# Patient Record
Sex: MALE | Race: WHITE | NOT HISPANIC OR LATINO
[De-identification: names, ages, dates, MRNs, and addresses within clinical notes are randomized per-mention and may not be internally consistent; named-entity substitution may affect disease eponyms.]

---

## 2018-10-02 ENCOUNTER — APPOINTMENT (OUTPATIENT)
Dept: NEUROLOGY | Facility: CLINIC | Age: 53
End: 2018-10-02
Payer: COMMERCIAL

## 2018-10-02 VITALS
SYSTOLIC BLOOD PRESSURE: 147 MMHG | BODY MASS INDEX: 26.79 KG/M2 | WEIGHT: 220 LBS | HEART RATE: 55 BPM | HEIGHT: 76 IN | DIASTOLIC BLOOD PRESSURE: 78 MMHG

## 2018-10-02 DIAGNOSIS — R42 DIZZINESS AND GIDDINESS: ICD-10-CM

## 2018-10-02 DIAGNOSIS — D72.9 DISORDER OF WHITE BLOOD CELLS, UNSPECIFIED: ICD-10-CM

## 2018-10-02 DIAGNOSIS — Q87.89 OTHER SPECIFIED CONGENITAL MALFORMATION SYNDROMES, NOT ELSEWHERE CLASSIFIED: ICD-10-CM

## 2018-10-02 DIAGNOSIS — F79 OTHER SPECIFIED CONGENITAL MALFORMATION SYNDROMES, NOT ELSEWHERE CLASSIFIED: ICD-10-CM

## 2018-10-02 DIAGNOSIS — Z86.79 PERSONAL HISTORY OF OTHER DISEASES OF THE CIRCULATORY SYSTEM: ICD-10-CM

## 2018-10-02 DIAGNOSIS — Q04.3 OTHER SPECIFIED CONGENITAL MALFORMATION SYNDROMES, NOT ELSEWHERE CLASSIFIED: ICD-10-CM

## 2018-10-02 PROCEDURE — 93892 TCD EMBOLI DETECT W/O INJ: CPT

## 2018-10-02 PROCEDURE — 93886 INTRACRANIAL COMPLETE STUDY: CPT

## 2018-10-02 PROCEDURE — 93880 EXTRACRANIAL BILAT STUDY: CPT

## 2018-10-02 PROCEDURE — 99205 OFFICE O/P NEW HI 60 MIN: CPT

## 2018-10-02 RX ORDER — CYCLOBENZAPRINE HYDROCHLORIDE 10 MG/1
10 TABLET, FILM COATED ORAL WEEKLY
Refills: 0 | Status: ACTIVE | COMMUNITY

## 2018-10-03 ENCOUNTER — TRANSCRIPTION ENCOUNTER (OUTPATIENT)
Age: 53
End: 2018-10-03

## 2021-02-14 ENCOUNTER — NON-APPOINTMENT (OUTPATIENT)
Age: 56
End: 2021-02-14

## 2021-02-19 ENCOUNTER — APPOINTMENT (OUTPATIENT)
Dept: DISASTER EMERGENCY | Facility: CLINIC | Age: 56
End: 2021-02-19

## 2021-02-19 DIAGNOSIS — Z01.818 ENCOUNTER FOR OTHER PREPROCEDURAL EXAMINATION: ICD-10-CM

## 2021-02-20 LAB — SARS-COV-2 N GENE NPH QL NAA+PROBE: NOT DETECTED

## 2021-07-15 ENCOUNTER — OUTPATIENT (OUTPATIENT)
Dept: OUTPATIENT SERVICES | Facility: HOSPITAL | Age: 56
LOS: 1 days | Discharge: HOME | End: 2021-07-15
Payer: COMMERCIAL

## 2021-07-15 VITALS
SYSTOLIC BLOOD PRESSURE: 148 MMHG | HEART RATE: 68 BPM | RESPIRATION RATE: 16 BRPM | DIASTOLIC BLOOD PRESSURE: 86 MMHG | OXYGEN SATURATION: 98 % | TEMPERATURE: 98 F | HEIGHT: 76 IN | WEIGHT: 220.46 LBS

## 2021-07-15 DIAGNOSIS — Z98.890 OTHER SPECIFIED POSTPROCEDURAL STATES: Chronic | ICD-10-CM

## 2021-07-15 DIAGNOSIS — M54.12 RADICULOPATHY, CERVICAL REGION: ICD-10-CM

## 2021-07-15 DIAGNOSIS — Z90.89 ACQUIRED ABSENCE OF OTHER ORGANS: Chronic | ICD-10-CM

## 2021-07-15 DIAGNOSIS — Z01.818 ENCOUNTER FOR OTHER PREPROCEDURAL EXAMINATION: ICD-10-CM

## 2021-07-15 LAB
ANION GAP SERPL CALC-SCNC: 7 MMOL/L — SIGNIFICANT CHANGE UP (ref 5–17)
BUN SERPL-MCNC: 11 MG/DL — SIGNIFICANT CHANGE UP (ref 7–23)
CALCIUM SERPL-MCNC: 8.9 MG/DL — SIGNIFICANT CHANGE UP (ref 8.5–10.1)
CHLORIDE SERPL-SCNC: 104 MMOL/L — SIGNIFICANT CHANGE UP (ref 96–108)
CO2 SERPL-SCNC: 27 MMOL/L — SIGNIFICANT CHANGE UP (ref 22–31)
CREAT SERPL-MCNC: 1.26 MG/DL — SIGNIFICANT CHANGE UP (ref 0.5–1.3)
GLUCOSE SERPL-MCNC: 89 MG/DL — SIGNIFICANT CHANGE UP (ref 70–99)
HCT VFR BLD CALC: 46 % — SIGNIFICANT CHANGE UP (ref 39–50)
HGB BLD-MCNC: 15.4 G/DL — SIGNIFICANT CHANGE UP (ref 13–17)
MCHC RBC-ENTMCNC: 30.7 PG — SIGNIFICANT CHANGE UP (ref 27–34)
MCHC RBC-ENTMCNC: 33.5 GM/DL — SIGNIFICANT CHANGE UP (ref 32–36)
MCV RBC AUTO: 91.8 FL — SIGNIFICANT CHANGE UP (ref 80–100)
NRBC # BLD: 0 /100 WBCS — SIGNIFICANT CHANGE UP (ref 0–0)
PLATELET # BLD AUTO: 233 K/UL — SIGNIFICANT CHANGE UP (ref 150–400)
POTASSIUM SERPL-MCNC: 3.8 MMOL/L — SIGNIFICANT CHANGE UP (ref 3.5–5.3)
POTASSIUM SERPL-SCNC: 3.8 MMOL/L — SIGNIFICANT CHANGE UP (ref 3.5–5.3)
RBC # BLD: 5.01 M/UL — SIGNIFICANT CHANGE UP (ref 4.2–5.8)
RBC # FLD: 12.8 % — SIGNIFICANT CHANGE UP (ref 10.3–14.5)
SODIUM SERPL-SCNC: 138 MMOL/L — SIGNIFICANT CHANGE UP (ref 135–145)
WBC # BLD: 10.44 K/UL — SIGNIFICANT CHANGE UP (ref 3.8–10.5)
WBC # FLD AUTO: 10.44 K/UL — SIGNIFICANT CHANGE UP (ref 3.8–10.5)

## 2021-07-15 PROCEDURE — 93010 ELECTROCARDIOGRAM REPORT: CPT

## 2021-07-15 RX ORDER — SODIUM CHLORIDE 9 MG/ML
3 INJECTION INTRAMUSCULAR; INTRAVENOUS; SUBCUTANEOUS EVERY 8 HOURS
Refills: 0 | Status: DISCONTINUED | OUTPATIENT
Start: 2021-07-20 | End: 2021-08-03

## 2021-07-15 NOTE — H&P PST ADULT - NSICDXPROBLEM_GEN_ALL_CORE_FT
PROBLEM DIAGNOSES  Problem: Radiculopathy, cervical region  Assessment and Plan: decompression laminectomy   Pre-op instructions given by RN, patient verbalized understanding  Chlorhexidine wash instructions given   medical clearance pending

## 2021-07-15 NOTE — H&P PST ADULT - ATTENDING COMMENTS
indicated for lumbar decompression due to lumbar stenosis with radiculopathy - r/b/e of the procedure discussed and questions answered - well informed and would like to proceed

## 2021-07-15 NOTE — H&P PST ADULT - HISTORY OF PRESENT ILLNESS
55M no pmhx c/o low back pain radiating to RLE associated with numbness and tingling 2/2 radiculopathy here for PST for [id number] Decompression laminectomy'  this patient denies any fever, cough, sob, flu like symptoms or travel outside of the [place] in the past 30 days  COVID vaccination completed copy of card on chart

## 2021-07-15 NOTE — H&P PST ADULT - ASSESSMENT
55M no pmhx c/o low back pain radiating to RLE associated with numbness and tingling 2/2 radiculopathy here for PST for [id number] Decompression laminectomy'  CAPRINI SCORE    AGE RELATED RISK FACTORS                                                       MOBILITY RELATED FACTORS  [x ] Age 41-60 years                                            (1 Point)                  [ ] Bed rest                                                        (1 Point)  [ ] Age: 61-74 years                                           (2 Points)                [ ] Plaster cast                                                   (2 Points)  [ ] Age= 75 years                                              (3 Points)                 [ ] Bed bound for more than 72 hours                   (2 Points)    DISEASE RELATED RISK FACTORS                                               GENDER SPECIFIC FACTORS  [x ] Edema in the lower extremities                       (1 Point)                  [ ] Pregnancy                                                     (1 Point)  [ ] Varicose veins                                               (1 Point)                  [ ] Post-partum < 6 weeks                                   (1 Point)             [x ] BMI > 25 Kg/m2                                            (1 Point)                  [ ] Hormonal therapy  or oral contraception            (1 Point)                 [ ] Sepsis (in the previous month)                        (1 Point)                  [ ] History of pregnancy complications  [ ] Pneumonia or serious lung disease                                               [ ] Unexplained or recurrent                       (1 Point)           (in the previous month)                               (1 Point)  [ ] Abnormal pulmonary function test                     (1 Point)                 SURGERY RELATED RISK FACTORS  [ ] Acute myocardial infarction                              (1 Point)                 [ ]  Section                                            (1 Point)  [ ] Congestive heart failure (in the previous month)  (1 Point)                 [ ] Minor surgery                                                 (1 Point)   [ ] Inflammatory bowel disease                             (1 Point)                 [ ] Arthroscopic surgery                                        (2 Points)  [ ] Central venous access                                    (2 Points)                [x ] General surgery lasting more than 45 minutes   (2 Points)       [ ] Stroke (in the previous month)                          (5 Points)               [ ] Elective arthroplasty                                        (5 Points)                                                                                                                                               HEMATOLOGY RELATED FACTORS                                                 TRAUMA RELATED RISK FACTORS  [ ] Prior episodes of VTE                                     (3 Points)                 [ ] Fracture of the hip, pelvis, or leg                       (5 Points)  [ ] Positive family history for VTE                         (3 Points)                 [ ] Acute spinal cord injury (in the previous month)  (5 Points)  [ ] Prothrombin 16066 A                                      (3 Points)                 [ ] Paralysis  (less than 1 month)                          (5 Points)  [ ] Factor V Leiden                                             (3 Points)                 [ ] Multiple Trauma within 1 month                         (5 Points)  [ ] Lupus anticoagulants                                     (3 Points)                                                           [ ] Anticardiolipin antibodies                                (3 Points)                                                       [ ] High homocysteine in the blood                      (3 Points)                                             [ ] Other congenital or acquired thrombophilia       (3 Points)                                                [ ] Heparin induced thrombocytopenia                  (3 Points)                                          Total Score [      5    ]

## 2021-07-19 ENCOUNTER — TRANSCRIPTION ENCOUNTER (OUTPATIENT)
Age: 56
End: 2021-07-19

## 2021-07-20 ENCOUNTER — OUTPATIENT (OUTPATIENT)
Dept: OUTPATIENT SERVICES | Facility: HOSPITAL | Age: 56
LOS: 1 days | Discharge: HOME | End: 2021-07-20

## 2021-07-20 VITALS
SYSTOLIC BLOOD PRESSURE: 155 MMHG | HEART RATE: 63 BPM | DIASTOLIC BLOOD PRESSURE: 80 MMHG | RESPIRATION RATE: 15 BRPM | OXYGEN SATURATION: 97 %

## 2021-07-20 VITALS
HEART RATE: 72 BPM | HEIGHT: 76 IN | TEMPERATURE: 99 F | WEIGHT: 229.94 LBS | DIASTOLIC BLOOD PRESSURE: 77 MMHG | RESPIRATION RATE: 16 BRPM | SYSTOLIC BLOOD PRESSURE: 154 MMHG | OXYGEN SATURATION: 98 %

## 2021-07-20 DIAGNOSIS — Z98.890 OTHER SPECIFIED POSTPROCEDURAL STATES: Chronic | ICD-10-CM

## 2021-07-20 DIAGNOSIS — Z90.89 ACQUIRED ABSENCE OF OTHER ORGANS: Chronic | ICD-10-CM

## 2021-07-20 RX ORDER — HYDROMORPHONE HYDROCHLORIDE 2 MG/ML
0.5 INJECTION INTRAMUSCULAR; INTRAVENOUS; SUBCUTANEOUS
Refills: 0 | Status: DISCONTINUED | OUTPATIENT
Start: 2021-07-20 | End: 2021-07-20

## 2021-07-20 RX ORDER — SODIUM CHLORIDE 9 MG/ML
1000 INJECTION, SOLUTION INTRAVENOUS
Refills: 0 | Status: DISCONTINUED | OUTPATIENT
Start: 2021-07-20 | End: 2021-07-20

## 2021-07-20 RX ORDER — GABAPENTIN 400 MG/1
1 CAPSULE ORAL
Qty: 0 | Refills: 0 | DISCHARGE

## 2021-07-20 RX ORDER — ACETAMINOPHEN 500 MG
1000 TABLET ORAL ONCE
Refills: 0 | Status: COMPLETED | OUTPATIENT
Start: 2021-07-20 | End: 2021-07-20

## 2021-07-20 RX ADMIN — HYDROMORPHONE HYDROCHLORIDE 0.5 MILLIGRAM(S): 2 INJECTION INTRAMUSCULAR; INTRAVENOUS; SUBCUTANEOUS at 15:00

## 2021-07-20 RX ADMIN — Medication 1000 MILLIGRAM(S): at 15:20

## 2021-07-20 RX ADMIN — HYDROMORPHONE HYDROCHLORIDE 0.5 MILLIGRAM(S): 2 INJECTION INTRAMUSCULAR; INTRAVENOUS; SUBCUTANEOUS at 15:20

## 2021-07-20 RX ADMIN — SODIUM CHLORIDE 75 MILLILITER(S): 9 INJECTION, SOLUTION INTRAVENOUS at 15:00

## 2021-07-20 RX ADMIN — Medication 400 MILLIGRAM(S): at 15:00

## 2021-07-20 NOTE — ASU DISCHARGE PLAN (ADULT/PEDIATRIC) - ASU DC SPECIAL INSTRUCTIONSFT
ok to change dressing with sterile gauze post op day#3. Keep incision dry. If gauze gets wet it must be changed. DO not apply any topical lotions or creams to wound. Avoid bending, lifting and twisting.

## 2021-07-20 NOTE — ASU DISCHARGE PLAN (ADULT/PEDIATRIC) - CALL YOUR DOCTOR IF YOU HAVE ANY OF THE FOLLOWING:
Swelling that gets worse/Pain not relieved by Medications/Fever greater than (need to indicate Fahrenheit or Celsius)/Wound/Surgical Site with redness, or foul smelling discharge or pus/Nausea and vomiting that does not stop/Unable to urinate

## 2021-07-22 DIAGNOSIS — M48.07 SPINAL STENOSIS, LUMBOSACRAL REGION: ICD-10-CM

## 2021-07-22 DIAGNOSIS — Z88.0 ALLERGY STATUS TO PENICILLIN: ICD-10-CM

## 2021-07-22 DIAGNOSIS — M54.17 RADICULOPATHY, LUMBOSACRAL REGION: ICD-10-CM

## 2021-07-22 DIAGNOSIS — Z72.0 TOBACCO USE: ICD-10-CM

## 2022-09-02 ENCOUNTER — TRANSCRIPTION ENCOUNTER (OUTPATIENT)
Age: 57
End: 2022-09-02

## 2022-09-02 ENCOUNTER — APPOINTMENT (OUTPATIENT)
Dept: ORTHOPEDIC SURGERY | Facility: CLINIC | Age: 57
End: 2022-09-02

## 2022-09-02 VITALS — WEIGHT: 220 LBS | HEIGHT: 76 IN | BODY MASS INDEX: 26.79 KG/M2

## 2022-09-02 PROCEDURE — 99214 OFFICE O/P EST MOD 30 MIN: CPT

## 2022-09-02 RX ORDER — COLLAGENASE CLOSTRIDIUM HISTOLYTICUM 0.9 MG
0.9 KIT INJECTION
Qty: 2 | Refills: 0 | Status: ACTIVE | COMMUNITY
Start: 2022-09-02 | End: 1900-01-01

## 2022-09-02 NOTE — HISTORY OF PRESENT ILLNESS
[7] : 7 [6] : 6 [Dull/Aching] : dull/aching [de-identified] : L hand getting worse\par Difficulty with ADLS\par \par R hand is stable [] : no [FreeTextEntry1] : hands [FreeTextEntry3] : 2021 [FreeTextEntry5] : left hand can not straightnen Mf,SF-in plam feels nodules [de-identified] : 2021 [de-identified] : Dr. Chris

## 2022-09-02 NOTE — PHYSICAL EXAM
[de-identified] : R hand \par Dupuytrens disease, minimal contracture\par \par L hand \par MF MCP 45 deg contracture with palp cord\par SF PIP 30 deg contracture with palp cord\par +table top test\par

## 2023-01-23 ENCOUNTER — APPOINTMENT (OUTPATIENT)
Dept: ORTHOPEDIC SURGERY | Facility: CLINIC | Age: 58
End: 2023-01-23
Payer: COMMERCIAL

## 2023-01-23 VITALS — HEIGHT: 76 IN | BODY MASS INDEX: 26.79 KG/M2 | WEIGHT: 220 LBS

## 2023-01-23 PROCEDURE — 99213 OFFICE O/P EST LOW 20 MIN: CPT

## 2023-01-23 NOTE — PHYSICAL EXAM
[de-identified] : R hand \par Dupuytrens disease, minimal contracture\par \par L hand \par MF MCP 45 deg contracture with palp cord\par SF PIP 30 deg contracture with palp cord\par +table top test\par

## 2023-01-23 NOTE — HISTORY OF PRESENT ILLNESS
[5] : 5 [Dull/Aching] : dull/aching [de-identified] : Insurance not auth Xiaflex\par  [FreeTextEntry1] : L hand [de-identified] : none

## 2023-02-01 ENCOUNTER — APPOINTMENT (OUTPATIENT)
Dept: ORTHOPEDIC SURGERY | Facility: CLINIC | Age: 58
End: 2023-02-01
Payer: COMMERCIAL

## 2023-02-01 VITALS — BODY MASS INDEX: 26.79 KG/M2 | WEIGHT: 220 LBS | HEIGHT: 76 IN

## 2023-02-01 PROCEDURE — 99214 OFFICE O/P EST MOD 30 MIN: CPT | Mod: 57

## 2023-02-03 NOTE — HISTORY OF PRESENT ILLNESS
[de-identified] : 57M, RHD, PMHx of orthostatic hypotension presents with left hand Dupuytren's contracture. Reports has had symptoms since 23021. Reports difficulty straightening middle finger and small finger. Feels nodules in palm. Starting to affect ADLs. DId see Dr. Chris, was trying to obtain Xiaflex injections but were denied by insurance.

## 2023-02-03 NOTE — ASSESSMENT
[FreeTextEntry1] : Left middle and small finger Dupuytren's contracture - discussed pathogenesis and anatomy with patient. Discussed this is a benign, progressive disease that is based in his genetics. Discuss treatment ladder including nonoperative management, aponeurotomy and fasciectomy. Discussed prophylactic operative treatment is not indicated for patients without contracture but that given his level of contracture and dysfunction in ADLs, intervention is indicated. Discussed risks, benefits, and alternatives with risks of infection, skin tear, recurrence, neurovascular injury, and tendon injury. Discussed that given his notable cord causing MP contracture, pursuing the less invasive aponeurotomy is reasonable. We discussed higher level of recurrence with aponeurotomy, higher level of risk of neurovascular injury with fasciectomy and that PIP contracture will unlikely resolve with aponeurotomy but that MP contracture should improve.\par \par Patient understood this discussion and elected to proceed with left middle and small finger aponeurotomy under local anesthesia. PJASC\par \par F/u 2 days after surgery ([facility] based - need to find OT, [place])

## 2023-02-03 NOTE — IMAGING
[de-identified] : Left hand with no erythema or swelling. Able to make fist and extend all digits except for small finger which possesses a pretendinous cord and thickened palmar tissue to middle and small finger resulting in 25-30 degree MCP contracture to MF and 25 at PIP of small finger. Sensation intact throughout with <2sec cap refill.

## 2023-02-15 ENCOUNTER — APPOINTMENT (OUTPATIENT)
Dept: ORTHOPEDIC SURGERY | Facility: AMBULATORY SURGERY CENTER | Age: 58
End: 2023-02-15
Payer: COMMERCIAL

## 2023-02-15 PROCEDURE — 26040 RELEASE PALM CONTRACTURE: CPT | Mod: LT,59

## 2023-02-21 ENCOUNTER — APPOINTMENT (OUTPATIENT)
Dept: ORTHOPEDIC SURGERY | Facility: CLINIC | Age: 58
End: 2023-02-21
Payer: COMMERCIAL

## 2023-02-21 VITALS — WEIGHT: 220 LBS | BODY MASS INDEX: 26.79 KG/M2 | HEIGHT: 76 IN

## 2023-02-21 PROCEDURE — 99024 POSTOP FOLLOW-UP VISIT: CPT

## 2023-02-26 NOTE — IMAGING
[de-identified] : Left hand with no erythema or swelling. Able to make fist and extend all digits. Well healed aponeurotomy sites with no erythema nor drainage. 0 degree MCP contracture at MF and SF with persistent PIP contracture at small finger PIP j. Sensation intact throughout with <2sec cap refill.

## 2023-02-26 NOTE — ASSESSMENT
[FreeTextEntry1] :  s/p left hand Dupuytrens aponeurotomy to middle and small fingers [2/15/23] - progressing well postop. Script for OT ROM and for nighttime extension brace provided. WBAT. Neosporin to site if dry.\par \par F/u 4-6 weeks.

## 2023-02-26 NOTE — HISTORY OF PRESENT ILLNESS
[de-identified] : 57M, RHD, PMHx of orthostatic hypotension presents with left hand Dupuytren's contracture. Reports has had symptoms since 23021. Reports difficulty straightening middle finger and small finger. Feels nodules in palm. Starting to affect ADLs. DId see Dr. Chris, was trying to obtain Xiaflex injections but were denied by insurance. \par \par 2/21/23: s/p left hand Dupuytrens aponeurotomy to middle and small fingers [2/15/23]. Patient reports some soreness, denies numbness/tingling. Soreenss happens with movement.

## 2023-03-28 ENCOUNTER — APPOINTMENT (OUTPATIENT)
Dept: ORTHOPEDIC SURGERY | Facility: CLINIC | Age: 58
End: 2023-03-28
Payer: COMMERCIAL

## 2023-03-28 VITALS — BODY MASS INDEX: 26.79 KG/M2 | HEIGHT: 76 IN | WEIGHT: 220 LBS

## 2023-03-28 DIAGNOSIS — M72.0 PALMAR FASCIAL FIBROMATOSIS [DUPUYTREN]: ICD-10-CM

## 2023-03-28 PROCEDURE — 99024 POSTOP FOLLOW-UP VISIT: CPT

## 2023-03-28 NOTE — IMAGING
[de-identified] : Left hand with no erythema or swelling. Able to make fist and extend all digits. Well healed aponeurotomy sites with no erythema nor drainage. 0 degree MCP contracture at MF and SF with persistent PIP contracture at small finger PIP j. Sensation intact throughout with <2sec cap refill.

## 2023-03-28 NOTE — ASSESSMENT
[FreeTextEntry1] :  s/p left hand Dupuytren's aponeurotomy to middle and small fingers [2/15/23] - progressing well postop. Script for OT ROM and for nighttime extension brace provided. WBAT. \par \par F/u 3mo/prn

## 2023-03-28 NOTE — HISTORY OF PRESENT ILLNESS
[de-identified] : 57M, RHD, PMHx of orthostatic hypotension presents with left hand Dupuytren's contracture. Reports has had symptoms since 23021. Reports difficulty straightening middle finger and small finger. Feels nodules in palm. Starting to affect ADLs. DId see Dr. Chris, was trying to obtain Xiaflex injections but were denied by insurance. \par \par 2/21/23: s/p left hand Dupuytrens aponeurotomy to middle and small fingers [2/15/23]. Patient reports some soreness, denies numbness/tingling. Soreenss happens with movement. \par \par 3/28/23: s/p left hand dupuytrens aponeurotomy to middle and small fingers [2/15/23]. He reports soreness while stretching the fingers, does have numbness/tingling at night while wearing the brace. No other issues.

## 2023-08-21 ENCOUNTER — APPOINTMENT (OUTPATIENT)
Dept: ORTHOPEDIC SURGERY | Facility: CLINIC | Age: 58
End: 2023-08-21
Payer: COMMERCIAL

## 2023-08-21 ENCOUNTER — APPOINTMENT (OUTPATIENT)
Dept: ORTHOPEDIC SURGERY | Facility: CLINIC | Age: 58
End: 2023-08-21

## 2023-08-21 VITALS — WEIGHT: 220 LBS | BODY MASS INDEX: 26.79 KG/M2 | HEIGHT: 76 IN

## 2023-08-21 PROCEDURE — 20551 NJX 1 TENDON ORIGIN/INSJ: CPT | Mod: LT

## 2023-08-21 PROCEDURE — J3490M: CUSTOM

## 2023-08-21 PROCEDURE — 99214 OFFICE O/P EST MOD 30 MIN: CPT | Mod: 25

## 2023-08-21 NOTE — ASSESSMENT
[FreeTextEntry1] : L Lateral epicondyle injection was performed because of pain inflammation Anesthesia: ethyl chloride sprayed topically Celestone 6mg: An injection of Celestone 2 cc Lidocaine: An injection of Lidocaine 1% 2 cc Marcaine: An injection of Marcaine 0.5% 2 cc  Patient has tried OTC's including aspirin, Ibuprofen, Aleve etc or prescription NSAIDS, and/or exercises at home and/ or physical therapy without satisfactory response. After verbal consent using sterile preparation and technique. The risks, benefits, and alternatives to cortisone injection were explained in full to the patient. Risks outlined include but are not limited to infection, sepsis, bleeding, scarring, skin discoloration, temporary increase in pain, syncopal episode, failure to resolve symptoms, allergic reaction, symptom recurrence, and elevation of blood sugar in diabetics. Patient understood the risks. All questions were answered. After discussion of options, patient requested an injection. Oral informed consent was obtained and sterile prep was done of the injection site. Sterile technique was utilized for the procedure including the preparation of the solutions used for the injection. Patient tolerated the procedure well. Advised to ice the injection site this evening. Prep with betadine locally to site. Sterile technique used

## 2023-08-21 NOTE — HISTORY OF PRESENT ILLNESS
[10] : 10 [6] : 6 [Burning] : burning [Dull/Aching] : dull/aching [Ice] : ice [de-identified] : L elbow pain getting worse since June No trauma   [] : no [FreeTextEntry1] : L elbow [FreeTextEntry3] : June 2023 [FreeTextEntry5] : no injury

## 2023-08-21 NOTE — PHYSICAL EXAM
[de-identified] : L elbow: mild swelling Tender lateral epicondyle Pain with ROM Pain with forced wrist extension

## 2023-10-02 ENCOUNTER — APPOINTMENT (OUTPATIENT)
Dept: ORTHOPEDIC SURGERY | Facility: CLINIC | Age: 58
End: 2023-10-02
Payer: COMMERCIAL

## 2023-10-02 VITALS — WEIGHT: 220 LBS | HEIGHT: 76 IN | BODY MASS INDEX: 26.79 KG/M2

## 2023-10-02 PROCEDURE — 99213 OFFICE O/P EST LOW 20 MIN: CPT | Mod: 25

## 2023-10-02 PROCEDURE — J3490M: CUSTOM

## 2023-10-02 PROCEDURE — 20551 NJX 1 TENDON ORIGIN/INSJ: CPT | Mod: LT

## 2023-10-12 ENCOUNTER — APPOINTMENT (OUTPATIENT)
Dept: ORTHOPEDIC SURGERY | Facility: CLINIC | Age: 58
End: 2023-10-12
Payer: COMMERCIAL

## 2023-10-12 VITALS — WEIGHT: 220 LBS | HEIGHT: 76 IN | BODY MASS INDEX: 26.79 KG/M2

## 2023-10-12 PROCEDURE — 99213 OFFICE O/P EST LOW 20 MIN: CPT

## 2023-10-30 ENCOUNTER — APPOINTMENT (OUTPATIENT)
Dept: ORTHOPEDIC SURGERY | Facility: CLINIC | Age: 58
End: 2023-10-30
Payer: COMMERCIAL

## 2023-10-30 VITALS — BODY MASS INDEX: 26.79 KG/M2 | HEIGHT: 76 IN | WEIGHT: 220 LBS

## 2023-10-30 PROCEDURE — 99211 OFF/OP EST MAY X REQ PHY/QHP: CPT

## 2023-12-12 ENCOUNTER — APPOINTMENT (OUTPATIENT)
Dept: ORTHOPEDIC SURGERY | Facility: CLINIC | Age: 58
End: 2023-12-12
Payer: COMMERCIAL

## 2023-12-12 VITALS
SYSTOLIC BLOOD PRESSURE: 133 MMHG | DIASTOLIC BLOOD PRESSURE: 69 MMHG | WEIGHT: 220 LBS | HEIGHT: 76 IN | BODY MASS INDEX: 26.79 KG/M2 | HEART RATE: 80 BPM

## 2023-12-12 PROCEDURE — 99203 OFFICE O/P NEW LOW 30 MIN: CPT

## 2023-12-12 RX ORDER — DICLOFENAC SODIUM 20 MG/G
2 SOLUTION TOPICAL 3 TIMES DAILY
Qty: 1 | Refills: 0 | Status: ACTIVE | COMMUNITY
Start: 2023-12-12 | End: 1900-01-01

## 2023-12-18 ENCOUNTER — APPOINTMENT (OUTPATIENT)
Dept: ORTHOPEDIC SURGERY | Facility: CLINIC | Age: 58
End: 2023-12-18
Payer: COMMERCIAL

## 2023-12-18 VITALS — WEIGHT: 220 LBS | BODY MASS INDEX: 26.79 KG/M2 | HEIGHT: 76 IN

## 2023-12-18 DIAGNOSIS — M77.12 LATERAL EPICONDYLITIS, LEFT ELBOW: ICD-10-CM

## 2023-12-18 PROCEDURE — 99213 OFFICE O/P EST LOW 20 MIN: CPT

## 2023-12-18 NOTE — HISTORY OF PRESENT ILLNESS
[2] : 2 [Dull/Aching] : dull/aching [de-identified] : L elbow PRP 7 weeks He feels better Still has some annoying aches [FreeTextEntry1] : L elbow [de-identified] : prp inj

## 2024-12-03 ENCOUNTER — APPOINTMENT (OUTPATIENT)
Facility: CLINIC | Age: 59
End: 2024-12-03